# Patient Record
Sex: MALE | URBAN - METROPOLITAN AREA
[De-identification: names, ages, dates, MRNs, and addresses within clinical notes are randomized per-mention and may not be internally consistent; named-entity substitution may affect disease eponyms.]

---

## 2020-08-03 ENCOUNTER — LAB REQUISITION (OUTPATIENT)
Dept: LAB | Facility: HOSPITAL | Age: 80
End: 2020-08-03
Payer: MEDICARE

## 2020-08-03 DIAGNOSIS — U07.1 COVID-19: ICD-10-CM

## 2020-08-03 DIAGNOSIS — Z11.59 ENCOUNTER FOR SCREENING FOR OTHER VIRAL DISEASES: ICD-10-CM

## 2020-08-03 DIAGNOSIS — Z77.098 CONTACT WITH AND (SUSPECTED) EXPOSURE TO OTHER HAZARDOUS, CHIEFLY NONMEDICINAL, CHEMICALS: ICD-10-CM

## 2020-08-03 PROCEDURE — U0003 INFECTIOUS AGENT DETECTION BY NUCLEIC ACID (DNA OR RNA); SEVERE ACUTE RESPIRATORY SYNDROME CORONAVIRUS 2 (SARS-COV-2) (CORONAVIRUS DISEASE [COVID-19]), AMPLIFIED PROBE TECHNIQUE, MAKING USE OF HIGH THROUGHPUT TECHNOLOGIES AS DESCRIBED BY CMS-2020-01-R: HCPCS | Performed by: PHYSICIAN ASSISTANT

## 2020-08-04 LAB — SARS-COV-2 N GENE RESP QL NAA+PROBE: NEGATIVE

## 2024-07-22 ENCOUNTER — NURSING HOME VISIT (OUTPATIENT)
Dept: GERIATRICS | Facility: OTHER | Age: 84
End: 2024-07-22
Payer: MEDICARE

## 2024-07-22 DIAGNOSIS — G20.A2 PARKINSON'S DISEASE WITH FLUCTUATING MANIFESTATIONS, UNSPECIFIED WHETHER DYSKINESIA PRESENT: Chronic | ICD-10-CM

## 2024-07-22 DIAGNOSIS — R41.89 COGNITIVE IMPAIRMENT: ICD-10-CM

## 2024-07-22 DIAGNOSIS — F32.9 REACTIVE DEPRESSION: ICD-10-CM

## 2024-07-22 DIAGNOSIS — Z86.73 H/O: CVA (CEREBROVASCULAR ACCIDENT): Chronic | ICD-10-CM

## 2024-07-22 DIAGNOSIS — E03.9 ACQUIRED HYPOTHYROIDISM: Chronic | ICD-10-CM

## 2024-07-22 DIAGNOSIS — R13.12 OROPHARYNGEAL DYSPHAGIA: ICD-10-CM

## 2024-07-22 DIAGNOSIS — J69.0 ASPIRATION PNEUMONITIS (HCC): Primary | ICD-10-CM

## 2024-07-22 DIAGNOSIS — D64.9 ANEMIA, UNSPECIFIED TYPE: ICD-10-CM

## 2024-07-22 DIAGNOSIS — I10 PRIMARY HYPERTENSION: Chronic | ICD-10-CM

## 2024-07-22 DIAGNOSIS — R26.2 AMBULATORY DYSFUNCTION: ICD-10-CM

## 2024-07-22 DIAGNOSIS — G93.41 ACUTE METABOLIC ENCEPHALOPATHY: ICD-10-CM

## 2024-07-22 DIAGNOSIS — C84.44 PERIPHERAL T CELL LYMPHOMA OF AXILLARY LYMPH NODES (HCC): Chronic | ICD-10-CM

## 2024-07-22 PROBLEM — N39.42 URINARY INCONTINENCE WITHOUT SENSORY AWARENESS: Chronic | Status: ACTIVE | Noted: 2018-06-29
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
## 2024-07-22 PROBLEM — G20.A1 PARKINSON'S DISEASE: Chronic | Status: ACTIVE | Noted: 2018-05-23

## 2024-07-22 PROBLEM — K92.0 COFFEE GROUND EMESIS: Status: ACTIVE | Noted: 2024-06-19

## 2024-07-22 PROBLEM — E78.5 HYPERLIPIDEMIA: Chronic | Status: ACTIVE | Noted: 2018-05-04

## 2024-07-22 PROCEDURE — 99306 1ST NF CARE HIGH MDM 50: CPT | Performed by: INTERNAL MEDICINE

## 2024-07-22 RX ORDER — QUETIAPINE FUMARATE 25 MG/1
25 TABLET, FILM COATED ORAL
COMMUNITY
Start: 2024-07-19 | End: 2024-08-18

## 2024-07-22 RX ORDER — LEVOTHYROXINE SODIUM 0.03 MG/1
25 TABLET ORAL DAILY
COMMUNITY
Start: 2024-07-19

## 2024-07-22 RX ORDER — PAROXETINE 10 MG/1
10 TABLET, FILM COATED ORAL DAILY
COMMUNITY
Start: 2024-07-19

## 2024-07-22 RX ORDER — ASPIRIN 81 MG/1
81 TABLET ORAL DAILY
COMMUNITY
Start: 2024-07-19

## 2024-07-22 RX ORDER — RIVASTIGMINE 9.5 MG/24H
1 PATCH, EXTENDED RELEASE TRANSDERMAL DAILY
COMMUNITY
Start: 2024-07-19 | End: 2025-07-19

## 2024-07-22 RX ORDER — CHOLECALCIFEROL (VITAMIN D3) 50 MCG
1 TABLET ORAL DAILY
COMMUNITY

## 2024-07-22 RX ORDER — ASCORBIC ACID 500 MG
500 TABLET ORAL DAILY
COMMUNITY

## 2024-07-22 RX ORDER — ATORVASTATIN CALCIUM 80 MG/1
80 TABLET, FILM COATED ORAL DAILY
COMMUNITY
Start: 2024-07-19

## 2024-07-22 NOTE — ASSESSMENT & PLAN NOTE
Possibly multiple contribution also including achalasia  Continue ground meats and nectar thick liquids  Continue monitoring for aspiration   Speech eval and treat

## 2024-07-22 NOTE — ASSESSMENT & PLAN NOTE
BP reviewed from facility records, acceptable range   Not on meds possibly due to parkinson's disease which can cause autonomic dysfunction   Continue monitoring BP

## 2024-07-22 NOTE — ASSESSMENT & PLAN NOTE
Was treated with antibiotics - completed course  Daughter reports that at rehab he was at had food that he should not be having resulting in possible aspiration  Continue with aspiration precautions

## 2024-07-22 NOTE — ASSESSMENT & PLAN NOTE
Continue with sinemet  Continue with seroquel and exelon patch   Continue monitoring symptoms  Continue with safety measures

## 2024-07-22 NOTE — ASSESSMENT & PLAN NOTE
Multifactorial   Poor safety awareness also contributing   Hx of fall at other facility with staff helping patient  Continue with safety measures   Continue with fall precautions  PT/OT eval and treat

## 2024-07-22 NOTE — PROGRESS NOTES
[facility] notes  SHORT TERM REHAB       NAME: Cheikh Magana  AGE: 83 y.o. SEX: male    DATE OF ENCOUNTER: 7/22/2024    Assessment and Plan   Aspiration pneumonitis (HCC)  Was treated with antibiotics - completed course  Daughter reports that at rehab he was at had food that he should not be having resulting in possible aspiration  Continue with aspiration precautions     Oropharyngeal dysphagia  Possibly multiple contribution also including achalasia  Continue ground meats and nectar thick liquids  Continue monitoring for aspiration   Speech eval and treat    Ambulatory dysfunction  Multifactorial   Poor safety awareness also contributing   Hx of fall at other facility with staff helping patient  Continue with safety measures   Continue with fall precautions  PT/OT eval and treat     Acute metabolic encephalopathy  Was agitated   Possible also hospital delirium when in the hospital  Seems to be better   Continue with safety measures  Continue with delirium monitoring   Continue with supportive care     Cognitive impairment  May have a baseline memory issue  Continue with supportive care     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)  Continue follow up with oncology       Parkinson's disease  Continue with sinemet  Continue with seroquel and exelon patch   Continue monitoring symptoms  Continue with safety measures     Acquired hypothyroidism  Continue synthroid 25mcg po daily   Last TSH on 7/14/24 normal      Hypertension  BP reviewed from facility records, acceptable range   Not on meds possibly due to parkinson's disease which can cause autonomic dysfunction   Continue monitoring BP        Anemia  Last hb 10.0 on 7/18/24  Currently no signs of bleeding  Will repeat CBC       Reactive depression  Continue paxil  Continue monitoring symptoms   Continue with supportive care     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 Patient reports he does not recall anything that happened. Called and talked to daughter over the phone. As per daughter he was initially managed achalasia and dysphagia then discharged to inpatient acute rehab. She reported that he was sent to the hospital from there because he became lethargic and had a fever. She also reported that he was eating food at the other facility which he should not have been. She saw food on one prior visit that he was not supposed to be on it one time she went. She reports that is why he aspirated and aspiration pneumonitis. As per hospital records he was admitted and managed for aspiration pneumonitis. Daughter also report that he become agitated and confused in the hospital requiring medications. His symptoms improved and he become stable when he was  discharged to [facility] for therapy. Reviewed labs and imaging reports from the hospital records.       PMHx     Past Medical History:   Diagnosis Date    Alcohol abuse     Dementia (HCC)     Depression     HLD (hyperlipidemia)     Hypertension     Hypothyroidism     Lung cancer (HCC)     Lymphoma (HCC)     Parkinson disease     Stroke (HCC)     Urinary tract infection      Past Surgical History:   Procedure Laterality Date    CARPAL TUNNEL RELEASE      CATARACT EXTRACTION      LUNG LOBECTOMY       Family History   Problem Relation Age of Onset    Prostate cancer Father     Breast cancer Sister     Parkinsonism Sister      Social History     Socioeconomic History    Marital status: Unknown     Spouse name: None    Number of children: None    Years of education: None    Highest education level: None   Occupational History    None   Tobacco Use    Smoking status: Former     Types: Cigarettes    Smokeless tobacco: Never   Substance and Sexual Activity    Alcohol use: Not Currently    Drug use: Never     Sexual activity: None   Other Topics Concern    None   Social History Narrative    None     Social Determinants of Health     Financial Resource Strain: Low Risk  (7/15/2024)    Received from [facility]    Overall Financial Resource Strain (CARDIA)     Difficulty of Paying Living Expenses: Not hard at all   Food Insecurity: No Food Insecurity (7/15/2024)    Received from [facility]    Hunger Vital Sign     Worried About Running Out of Food in the Last Year: Never true     Ran Out of Food in the Last Year: Never true   Transportation Needs: No Transportation Needs (7/15/2024)    Received from [facility]    PRAPARE - Transportation     Lack of Transportation (Medical): No     Lack of Transportation (Non-Medical): No   Recent Concern: Transportation Needs - Unmet Transportation Needs (6/28/2024)    [facility]     Lack of Transportation (Medical): Yes     Lack of Transportation (Non-Medical): Patient declined   Physical Activity: Not on file   Stress: No Stress Concern Present (7/8/2024)    Received from [facility]    [employer] [place] [employer] - Occupational Stress Questionnaire     Feeling of Stress : Only a little   Social Connections: Feeling Somewhat Isolated (7/8/2024)    Received from [facility]    OASIS : Social Isolation     How often do you feel lonely or isolated from those around you?: Sometimes   Intimate Partner Violence: Not At Risk (7/15/2024)    Received from [facility]    Humiliation, Afraid, Rape, and Kick questionnaire     Fear of Current or Ex-Partner: No     Emotionally Abused: No     Physically Abused: No     Sexually Abused: No   Housing Stability: Low Risk  (7/15/2024)    Received from [facility]    Housing Stability Vital Sign     Unable to Pay for Housing in the Last Year: No     Number of  Times Moved in the Last Year: 0     Homeless in the Last Year: No     Allergies   Allergen Reactions    Itraconazole Other (See Comments)     Made rash worse per patient's daughter, Jackie   Other reaction(s): Rash    Prednisone Other (See Comments) and Rash     rash       Review of Systems     Denies any pain or shortness of breath   All other review of system negative but hx limited due to poor cognition         Objective   Vital signs:  BP: 124/75  HR: 70  RR: 16  TEMP: 98.4F  SAT.: 98% on RA     PHYSICAL EXAM:  GENERAL: no acute distress  SKIN: warm, dry, no rash, no cyanosis  HEENT: normocephalic, atraumatic, no JVD, no Thyromegaly, no lymphadenopathy  LUNGS: decreased and poor effort, no wheezing, no rales, expanded equally, no chest tenderness   HEART: normal rhythm, normal rate, no murmur, no gallop  ABDOMEN: soft non tender non distended bs+, no guarding or rebound tenderness  :  no suprapubic tenderness  MUSCULOSKELETAL: strength about 4+/5 all extremities, ROM within normal, no calf tenderness  NEUROLOGY: awake, alert, oriented to person but does not know place he is in but knows he is BRITTNEY [place]illy and thought it was Saturday, July 24, 2020, able to recall 1/3 object. CN2-12 intact.   PSYCH: cooperative, restless.       Pertinent Laboratory/Diagnostic Studies:  Recent labs and diagnostic tests reviewed in nursing home EMR    Current Medications   Medications reviewed and signed off on nursing home EMR.        I have spent a total time of 54 minutes on 07/22/24 in caring for this patient including Patient and family education, Counseling / Coordination of care, Documenting in the medical record, Reviewing / ordering tests, medicine, procedures  , and Obtaining or reviewing history  .

## 2024-07-22 NOTE — ASSESSMENT & PLAN NOTE
Was agitated   Possible also hospital delirium when in the hospital  Seems to be better   Continue with safety measures  Continue with delirium monitoring   Continue with supportive care

## 2024-07-29 ENCOUNTER — NURSING HOME VISIT (OUTPATIENT)
Dept: GERIATRICS | Facility: OTHER | Age: 84
End: 2024-07-29
Payer: MEDICARE

## 2024-07-29 DIAGNOSIS — R26.2 AMBULATORY DYSFUNCTION: Primary | ICD-10-CM

## 2024-07-29 DIAGNOSIS — J69.0 ASPIRATION PNEUMONITIS (HCC): ICD-10-CM

## 2024-07-29 DIAGNOSIS — C84.44 PERIPHERAL T CELL LYMPHOMA OF AXILLARY LYMPH NODES (HCC): Chronic | ICD-10-CM

## 2024-07-29 DIAGNOSIS — F32.9 REACTIVE DEPRESSION: ICD-10-CM

## 2024-07-29 PROCEDURE — 99309 SBSQ NF CARE MODERATE MDM 30: CPT | Performed by: INTERNAL MEDICINE

## 2024-07-29 RX ORDER — IPRATROPIUM BROMIDE AND ALBUTEROL SULFATE 2.5; .5 MG/3ML; MG/3ML
3 SOLUTION RESPIRATORY (INHALATION) EVERY 8 HOURS PRN
COMMUNITY

## 2024-07-29 NOTE — ASSESSMENT & PLAN NOTE
Multifactorial   Poor safety awareness   Continue with safety measures  Continue with fall precautions  Reviewed therapy notes  Discussed with therapist about patient

## 2024-07-29 NOTE — PROGRESS NOTES
[facility] notes  SHORT TERM REHAB       NAME: Cheikh Magana  AGE: 83 y.o. 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 Patient seen for ambulatory dysfunction, dysphagia, T cell lymphoma and depression. Reviewed nursing notes since last visit.    PMHx     Past Medical History:   Diagnosis Date    Alcohol abuse     Dementia (HCC)     Depression     HLD (hyperlipidemia)     Hypertension     Hypothyroidism     Lung cancer (HCC)     Lymphoma (HCC)     Parkinson disease     Stroke (HCC)     Urinary tract infection      Past Surgical History:   Procedure Laterality Date    CARPAL TUNNEL RELEASE      CATARACT EXTRACTION      LUNG LOBECTOMY       Family History   Problem Relation Age of Onset    Prostate cancer Father     Breast cancer Sister     Parkinsonism Sister      Social History     Socioeconomic History    Marital status: Unknown     Spouse name: Not on file    Number of children: Not on file    Years of education: Not on file    Highest education level: Not on file   Occupational History    Not on file   Tobacco Use    Smoking status: Former     Types:  Cigarettes    Smokeless tobacco: Never   Substance and Sexual Activity    Alcohol use: Not Currently    Drug use: Never    Sexual activity: Not on file   Other Topics Concern    Not on file   Social History Narrative    Not on file     Social Determinants of Health     Financial Resource Strain: Low Risk  (7/15/2024)    Received from [facility]    Overall Financial Resource Strain (CARDIA)     Difficulty of Paying Living Expenses: Not hard at all   Food Insecurity: No Food Insecurity (7/15/2024)    Received from [facility]    Hunger Vital Sign     Worried About Running Out of Food in the Last Year: Never true     Ran Out of Food in the Last Year: Never true   Transportation Needs: No Transportation Needs (7/15/2024)    Received from [facility]    PRAPARE - Transportation     Lack of Transportation (Medical): No     Lack of Transportation (Non-Medical): No   Recent Concern: Transportation Needs - Unmet Transportation Needs (6/28/2024)    [facility]     Lack of Transportation (Medical): Yes     Lack of Transportation (Non-Medical): Patient declined   Physical Activity: Not on file   Stress: No Stress Concern Present (7/8/2024)    Received from [facility]    [employer] [place] [employer] - Occupational Stress Questionnaire     Feeling of Stress : Only a little   Social Connections: Feeling Somewhat Isolated (7/8/2024)    Received from [facility]    OASIS : Social Isolation     How often do you feel lonely or isolated from those around you?: Sometimes   Intimate Partner Violence: Not At Risk (7/15/2024)    Received from [facility]    Humiliation, Afraid, Rape, and Kick questionnaire     Fear of Current or Ex-Partner: No     Emotionally Abused: No     Physically Abused: No     Sexually Abused: No   Housing Stability: Low Risk  (7/15/2024)     Received from [facility]    Housing Stability Vital Sign     Unable to Pay for Housing in the Last Year: No     Number of Times Moved in the Last Year: 0     Homeless in the Last Year: No     Allergies   Allergen Reactions    Itraconazole Other (See Comments)     Made rash worse per patient's daughterJackie   Other reaction(s): Rash    Prednisone Other (See Comments) and Rash     rash       Review of Systems     Denies any pain or shortness of breath   All other review of system negative but hx limited due to cognitive impairment         Objective   Vital signs reviewed from facility records.     PHYSICAL EXAM:  GENERAL: no acute distress  SKIN: warm, dry, no rash, no cyanosis  HEENT: normocephalic, atraumatic, no JVD, no Thyromegaly, no lymphadenopathy  LUNGS: CTA, no wheezing, no rales, expanded equally, no chest tenderness   HEART: normal rhythm, normal rate, no murmur, no gallop  ABDOMEN: soft non tender non distended bs+, no guarding or rebound tenderness  :  no suprapubic tenderness  MUSCULOSKELETAL: strength about 4+/5 all extremities,  no calf tenderness  NEUROLOGY: awake, alert, CN2-12 intact.   PSYCH: cooperative, pleasant.       Pertinent Laboratory/Diagnostic Studies:  Recent labs and diagnostic tests reviewed in nursing home EMR    Current Medications   Medications reviewed and signed off on nursing home EMR.

## 2024-07-29 NOTE — ASSESSMENT & PLAN NOTE
Looks like had another aspiration episode  Took roommates drink and drank it as per staff  CXR was done showed RLL   Was placed on augmentin will complete the course  Continue monitoring vitals  Seems to be doing well

## 2024-08-06 ENCOUNTER — NURSING HOME VISIT (OUTPATIENT)
Dept: GERIATRICS | Facility: OTHER | Age: 84
End: 2024-08-06
Payer: MEDICARE

## 2024-08-06 DIAGNOSIS — C84.10 SEZARY'S DISEASE, UNSPECIFIED BODY REGION (HCC): Chronic | ICD-10-CM

## 2024-08-06 DIAGNOSIS — E78.5 HYPERLIPIDEMIA, UNSPECIFIED HYPERLIPIDEMIA TYPE: Chronic | ICD-10-CM

## 2024-08-06 DIAGNOSIS — C84.44 PERIPHERAL T CELL LYMPHOMA OF AXILLARY LYMPH NODES (HCC): Primary | Chronic | ICD-10-CM

## 2024-08-06 DIAGNOSIS — I10 PRIMARY HYPERTENSION: Chronic | ICD-10-CM

## 2024-08-06 DIAGNOSIS — D64.9 ANEMIA, UNSPECIFIED TYPE: ICD-10-CM

## 2024-08-06 DIAGNOSIS — R26.2 AMBULATORY DYSFUNCTION: ICD-10-CM

## 2024-08-06 DIAGNOSIS — F32.9 REACTIVE DEPRESSION: ICD-10-CM

## 2024-08-06 DIAGNOSIS — E03.9 ACQUIRED HYPOTHYROIDISM: Chronic | ICD-10-CM

## 2024-08-06 DIAGNOSIS — Z86.73 H/O: CVA (CEREBROVASCULAR ACCIDENT): Chronic | ICD-10-CM

## 2024-08-06 DIAGNOSIS — G20.A2 PARKINSON'S DISEASE WITH FLUCTUATING MANIFESTATIONS, UNSPECIFIED WHETHER DYSKINESIA PRESENT: Chronic | ICD-10-CM

## 2024-08-06 DIAGNOSIS — R13.12 OROPHARYNGEAL DYSPHAGIA: ICD-10-CM

## 2024-08-06 PROCEDURE — 99309 SBSQ NF CARE MODERATE MDM 30: CPT | Performed by: NURSE PRACTITIONER

## 2024-08-06 RX ORDER — TRIAMCINOLONE ACETONIDE 1 MG/G
1 CREAM TOPICAL 2 TIMES DAILY
COMMUNITY

## 2024-08-06 NOTE — PROGRESS NOTES
"[facility]  [address], [address] [place] [address]  [phone]  DISCHARGE SUMMARY  Facility: [facility]  POS: 31: SNF/Short Term Rehab      NAME: Cheikh Magana  AGE: 83 y.o. SEX: male  DATE OF ADMISSION:   DATE OF DISCHARGE:AUGUST 8, 2024  DISCHARGE DISPOSITION: TO [facility] ([facility]: [facility])    Reason for admission: This is an 83-year-old male patient currently admitted at [facility] (7/19/2024 to present) for skilled nursing and rehabilitation services following discharge from acute care hospitalization ([facility]: 7/14-19/2024) with Dx of Acute Metabolic Encephalopathy, Sepsis - likely aspiration Pneumonitis, Ambulatory dysfunction    Admission Diagnoses: Ambulatory dysfunction with deconditioning  Discharge Diagnoses:   Ambulatory dysfunction  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    Course of stay: Patient is currently admitted at [facility] for rehabilitation services due to ambulatory dysfunction and deconditioning. Patient received 24/7 SNF supportive care, PT/OT/ST services. To date, patient stay in SNF has been uneventful.  Patient is a repair for this visit - well dressed -alert, cooperative, calm, very pleasant and not in distress.  Patient is verbal with clear coherent speech -oriented to name/birthday/ current year (2024) only.patient was not feeling well at this visit , \" I feel fine\" -denies any acute medical concerns during ROS assessment including pain. Per , patient is scheduled for discharge to [facility] on 8/8/2024.     Labs and testing performed during stay: Most recent lab results as below:     CBC with diff (8/5/2024):  (ABNORMAL) CBC WITH DIFF (08/05/2024 " 4:54 AM EDT)  Lab Results - (ABNORMAL) CBC WITH DIFF (08/05/2024 4:54 AM EDT)  Component Value Ref Range Test Method Analysis Time Performed At Pathologist Signature   Hemoglobin 10.2 (L) 12.5 - 17.0 g/dL   08/05/2024 11:09 AM EDT [facility]     Hematocrit 29.6 (L) 37.0 - 48.0 %   08/05/2024 11:09 AM EDT [facility]     WBC 3.3 (L) 4.0 - 10.5 thou/cmm   08/05/2024 11:09 AM EDT [facility]     RBC 3.71 (L) 4.00 - 5.40 mill/cmm   08/05/2024 11:09 AM EDT [facility]     Platelet Count 250 140 - 350 thou/cmm   08/05/2024 11:09 AM EDT [facility]     MPV 7.0 (L) 7.5 - 11.3 fL   08/05/2024 11:09 AM EDT [facility]     MCV 80 80 - 100 fL   08/05/2024 11:09 AM EDT [facility]     MCH 27.4 27.0 - 36.0 pg   08/05/2024 11:09 AM EDT [facility]     MCHC 34.3 32.0 - 37.0 g/dL   08/05/2024 11:09 AM EDT [facility]     RDW 16.1 (H) 12.0 - 16.0 %   08/05/2024 11:09 AM EDT [facility]     Differential Type AUTO     08/05/2024 11:09 AM EDT [facility]     Absolute Neutrophils 2.5 1.8 - 7.8 thou/cmm   08/05/2024 11:09 AM EDT [facility]     Absolute Lymphocytes 0.2 (L) 1.0 - 3.0 thou/cmm   08/05/2024 11:09 AM EDT [facility]     Absolute Monocytes 0.3 0.3 - 1.0 thou/cmm   08/05/2024 11:09 AM EDT [facility]     Absolute Eosinophils 0.2 0.0 - 0.5 thou/cmm   08/05/2024 11:09 AM EDT [facility]     Absolute Basophils 0.0 0.0 - 0.1 thou/cmm   08/05/2024 11:09 AM EDT [facility]     Neutrophils 76 %   08/05/2024 11:09 AM EDT [facility]     Lymphocytes 6 %   08/05/2024 11:09 AM EDT [facility]     Monocytes 11 %   08/05/2024 11:09 AM EDT [facility]     Eosinophils 6 %   08/05/2024 11:09 AM EDT [facility]     Basophils 1 %   08/05/2024 11:09 AM EDT [facility]        CMP (8/5/2024):  (ABNORMAL) COMPREHENSIVE METABOLIC PANEL (08/05/2024 4:54 AM EDT)  Lab Results -  (ABNORMAL) COMPREHENSIVE METABOLIC PANEL (08/05/2024 4:54 AM EDT)  Component Value Ref Range Test Method Analysis Time Performed At Pathologist Signature   Glucose 85 65 - 99 mg/dL   08/05/2024 11:22 AM EDT [facility]     BUN 20 7 - 28 mg/dL   08/05/2024 11:22 AM EDT [facility]     Creatinine 0.75 0.53 - 1.30 mg/dL   08/05/2024 11:22 AM EDT [facility]     Sodium 139 135 - 145 mmol/L   08/05/2024 11:22 AM EDT [facility]     Potassium 4.2 3.5 - 5.2 mmol/L   08/05/2024 11:22 AM EDT [facility]     Chloride 103 100 - 109 mmol/L   08/05/2024 11:22 AM EDT [facility]     Carbon Dioxide 26 21 - 31 mmol/L   08/05/2024 11:22 AM EDT [facility]     Calcium 8.7 8.5 - 10.1 mg/dL   08/05/2024 11:22 AM EDT [facility]     Alkaline Phosphatase 74 35 - 120 U/L   08/05/2024 11:22 AM EDT [facility]     Albumin 3.4 (L) 3.5 - 5.7 g/dL   08/05/2024 11:22 AM EDT [facility]     Bilirubin, Total 0.4 0.2 - 1.0 mg/dL   08/05/2024 11:22 AM EDT [facility]     Comment: Eltrombopag and its metabolites may interfere with this assay causing erroneously high patient results.   Protein, Total 6.5 6.3 - 8.3 g/dL   08/05/2024 11:22 AM EDT [facility]     AST 8 <41 U/L   08/05/2024 11:22 AM EDT [facility]     ALT 5 <56 U/L   08/05/2024 11:22 AM EDT [facility]     Anion Gap 10 3 - 11   08/05/2024 11:22 AM EDT [facility]     eGFRcr 90 >59   08/05/2024 11:22 AM EDT [facility]     eGFRcr Comment Interpretive information: calculated GFR     08/05/2024 11:22 AM EDT [facility]         Mg (8/5/2024) = 1.8    HISTORY:  Medical Hx: Reviewed, unchanged  Family Hx: Reviewed, unchanged  Soc Hx: Reviewed,  unchanged    ALLERGY: Reviewed, unchanged.  Allergies   Allergen Reactions    Itraconazole Other (See Comments)     Made rash worse per patient's daughterJackie   Other reaction(s): Rash    Prednisone Other (See  "Comments) and Rash     rash       Discharge Medications: See discharge medication list which was reviewed and signed.    Status at time of discharge: Stable    Today's Visit: 8/7/20242:55 PM    Subjective:\" I feel fine\"    Vitals:T: 98.0F -P68 -R16 BP: 128/88 (8/3/2024) SpO2: 97% RA  Weight: 159.1 lbs (8/4/2024) <= 154.1 lbs (7/20/2024)    Review of Systems   Constitutional: Negative.    HENT: Negative.     Eyes: Negative.    Respiratory: Negative.     Cardiovascular: Negative.    Gastrointestinal: Negative.    Endocrine: Negative.    Genitourinary: Negative.    Musculoskeletal: Negative.    Skin: Negative.    Allergic/Immunologic: Negative.    Neurological: Negative.    Hematological: Negative.    Psychiatric/Behavioral: Negative.         Exam: Physical Exam  Vitals and nursing note reviewed.   Constitutional:       General: He is not in acute distress.     Appearance: He is normal weight. He is not ill-appearing, toxic-appearing or diaphoretic.      Comments: Frail stature   HENT:      Head: Normocephalic and atraumatic.      Right Ear: Tympanic membrane, ear canal and external ear normal. There is no impacted cerumen.      Left Ear: Tympanic membrane, ear canal and external ear normal. There is no impacted cerumen.      Nose: Nose normal. No congestion or rhinorrhea.      Mouth/Throat:      Mouth: Mucous membranes are moist.      Pharynx: Oropharynx is clear. No oropharyngeal exudate or posterior oropharyngeal erythema.   Eyes:      General: No scleral icterus.        Right eye: No discharge.         Left eye: No discharge.      Conjunctiva/sclera: Conjunctivae normal.      Pupils: Pupils are equal, round, and reactive to light.   Neck:      Vascular: No carotid bruit.   Cardiovascular:      Rate and Rhythm: Normal rate. Rhythm irregular.      Heart sounds: No murmur heard.     No friction rub. Gallop present.   Pulmonary:      Effort: Pulmonary effort is normal. No respiratory distress.      Breath sounds: " Normal breath sounds. No stridor. No wheezing, rhonchi or rales.   Chest:      Chest wall: No tenderness.   Abdominal:      General: Abdomen is flat. Bowel sounds are normal. There is no distension.      Palpations: Abdomen is soft. There is no mass.      Tenderness: There is no abdominal tenderness. There is no right CVA tenderness, left CVA tenderness, guarding or rebound.      Hernia: No hernia is present.   Genitourinary:     Comments: Non distended bladder. Continent of B/B  Musculoskeletal:         General: No swelling, tenderness, deformity or signs of injury. Normal range of motion.      Cervical back: Normal range of motion and neck supple. No rigidity or tenderness.      Right lower leg: No edema.      Left lower leg: No edema.      Comments: Uses manual wheelchair   Lymphadenopathy:      Cervical: No cervical adenopathy.   Skin:     General: Skin is warm.      Capillary Refill: Capillary refill takes less than 2 seconds.      Coloration: Skin is not jaundiced or pale.      Findings: No bruising, erythema, lesion or rash.      Comments: Intact skin. NO DTI to bony prominences including heels   Neurological:      General: No focal deficit present.      Mental Status: He is alert. Mental status is at baseline.      Cranial Nerves: No cranial nerve deficit.      Sensory: No sensory deficit.      Motor: No weakness.      Coordination: Coordination normal.      Gait: Gait abnormal.      Deep Tendon Reflexes: Reflexes normal.      Comments: Verbal with clear coherent speech -oriented x 2 .   Psychiatric:         Mood and Affect: Mood normal.         Behavior: Behavior normal.      Comments: Alert, cooperative, calm, very pleasant and not in distress.       Discussion with patient/family and further instructions:  -Fall precautions  -Aspiration precautions  -Bleeding precautions  -Monitor for signs/symptoms of infection  -Medication list was reviewed and signed  -DME form was completed    Follow-up  Recommendations:     * Please follow-up with your primary care physician within 7-10 days of discharge to review medication changes and current status. The family/ patient needs to set-up an appointment for YAMIL with PCP upon discharge to [facility]..    * Recommend PT/OT service to continue strengthening, gait, balance and overall functional independence improvement gained during in-patient rehabilitation.      Problem List Follow-up Recommendations:    Sezary's disease (HCC)  Continue Triamcinolone 0.1% cream to rash on trunk and chest areas.  Followed and managed by Dermatology and Hema/Onco offices as out-patient.    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 office as out-patient:  - with cognitive decline likely Dementia  - Hx of hallucination  Continue Quetiapine 25mg daily/ Rivastigmine 9.5mg patch daily    Hypertension  BP range (7/19/24 to 8/3/2024) = 112/58 to 145/74  HR range (7/19/24 to 8/3/2024) = 59 to 92/min  Not on anti-HTN medication including diuretic therapy  Continue BP/HR checks in [facility]    Acquired hypothyroidism  Lab Results   Component Value Date    TSH 1.17 07/14/2024   Continue Levothyroxine 25mcg daily    H/O: CVA (cerebrovascular accident)  Hx of Left MCA ischemic stroke  Continue ASA 81mg daily/ Atorvastatin 80mg daily    Hyperlipidemia  Last Lipid test (2/11/2021): WNL except for low HDL: 32. LDL C: 44  Continue Atorvastatin 80mg daily    Anemia  Mild Chronic Anemia  Last Hbg/Hct (8/5/2024): 10.2/ 29.6 (L) / RBC: 3.71 (L) / RDW: 16.1 (H) <=  Hbg/Hct (7/2/2024): 9.6/27.0 (L)  Not in iron replacement  Continue Vit C 500mg daily  Recommend check Anemia Profile in [facility]    Reactive depression  Continue Paroxetine 10mg  daily  Recommend referral to Behavioral Health CRNP in [facility]    Oropharyngeal dysphagia  Recent management for sepsis - likely from aspiration Pneumonia  Continue current diet: Regular , Ground meats with Nectar thick liquids    Ambulatory dysfunction  Per therapy: Encourage use of assistive device  Transfers with assist of one and RW    Continue safety and fall precautions in [facility]      CURRENT MEDICATION LIST IN [facility]:    Current Outpatient Medications:     ascorbic acid (VITAMIN C) 500 mg tablet, Take 500 mg by mouth daily, Disp: , Rfl:     aspirin (ECOTRIN LOW STRENGTH) 81 mg EC tablet, Take 81 mg by mouth daily, Disp: , Rfl:     atorvastatin (LIPITOR) 80 mg tablet, Take 80 mg by mouth daily, Disp: , Rfl:     carbidopa-levodopa (SINEMET)  mg per tablet, Take 2 tablets by mouth Three times a day, Disp: , Rfl:     Cholecalciferol (Vitamin D3 Super Strength) 50 MCG (2000 UT) TABS, Take 1 tablet by mouth in the morning, Disp: , Rfl:     ipratropium-albuterol (DUO-NEB) 0.5-2.5 mg/3 mL nebulizer solution, Take 3 mL by nebulization every 8 (eight) hours as needed for wheezing or shortness of breath, Disp: , Rfl:     levothyroxine 25 mcg tablet, Take 25 mcg by mouth daily, Disp: , Rfl:     PARoxetine (PAXIL) 10 mg tablet, Take 10 mg by mouth daily, Disp: , Rfl:     QUEtiapine (SEROquel) 25 mg tablet, Take 25 mg by mouth daily at bedtime, Disp: , Rfl:     rivastigmine (EXELON) 9.5 mg/24 hr TD 24 hr patch, Place 1 patch on the skin daily, Disp: , Rfl:     triamcinolone (KENALOG) 0.1 % cream, Apply 1 Application topically 2 (two) times a day = to rash on trunk and chest, Disp: , Rfl:       Discharge Statement:  * Spent more than 30 minutes discharging the patient; greater than 50% spent on physical examination of patient, answering questions, discussion of plan of care, recommendations and providing post discharge instructions. Additional time spend on other discharge related activities including  "documentation.    Please note: This note was completed in part utilizing a voice-recognition software may have been used in the preparation of this document. Grammatical errors, random word insertion, spelling mistakes, and incomplete sentences may be an occasional consequence of the system secondary to software limitations, ambient noise and hardware issues. Occasional wrong word or \"sound-alike\" substitutions may have occurred due to the inherent limitations of voice recognition software. At the time of dictation, efforts were made to edit, clarify and/or correct errors. Interpretation should be guided by context. Please read the chart carefully and recognize, using context, where substitutions have occurred. If you have any questions or concerns about the context, text or information contained within the body of this dictation, please contact myself, the provider, for further clarification.      SOLE Esquivel  8/7/20242:55 PM  "

## 2024-08-07 ENCOUNTER — NURSING HOME VISIT (OUTPATIENT)
Dept: GERIATRICS | Facility: OTHER | Age: 84
End: 2024-08-07
Payer: MEDICARE

## 2024-08-07 DIAGNOSIS — J18.9 PNEUMONIA OF RIGHT LOWER LOBE DUE TO INFECTIOUS ORGANISM: Primary | ICD-10-CM

## 2024-08-07 PROCEDURE — 99309 SBSQ NF CARE MODERATE MDM 30: CPT | Performed by: INTERNAL MEDICINE

## 2024-08-07 NOTE — ASSESSMENT & PLAN NOTE
Most likely due to aspiration  Reported that at times patient drinks and food that he should not from other residents  Now having fever of 103F  Stat CXR ordered which showed right base infiltrate   Stat CBC and BMP pending  Also viral panel pending   Continue prn nebulizers if needed  Monitor vitals qshift including saturation   Will start on rocephin 1g IM BID x 7 days   Continue monitoring symptoms  Discharged postponed due to possible aspiration pneumonia

## 2024-08-07 NOTE — ASSESSMENT & PLAN NOTE
Last Lipid test (2/11/2021): WNL except for low HDL: 32. LDL C: 44  Continue Atorvastatin 80mg daily

## 2024-08-07 NOTE — ASSESSMENT & PLAN NOTE
Mild Chronic Anemia  Last Hbg/Hct (8/5/2024): 10.2/ 29.6 (L) / RBC: 3.71 (L) / RDW: 16.1 (H) <=  Hbg/Hct (7/2/2024): 9.6/27.0 (L)  Not in iron replacement  Continue Vit C 500mg daily  Recommend check Anemia Profile in [facility]

## 2024-08-07 NOTE — ASSESSMENT & PLAN NOTE
Recent management for sepsis - likely from aspiration Pneumonia  Continue current diet: Regular , Ground meats with Nectar thick liquids

## 2024-08-07 NOTE — ASSESSMENT & PLAN NOTE
Continue Triamcinolone 0.1% cream to rash on trunk and chest areas.  Followed and managed by Dermatology and Hema/Onco offices as out-patient.

## 2024-08-07 NOTE — ASSESSMENT & PLAN NOTE
BP range (7/19/24 to 8/3/2024) = 112/58 to 145/74  HR range (7/19/24 to 8/3/2024) = 59 to 92/min  Not on anti-HTN medication including diuretic therapy  Continue BP/HR checks in group home

## 2024-08-07 NOTE — ASSESSMENT & PLAN NOTE
Per therapy: Encourage use of assistive device  Transfers with assist of one and RW    Continue safety and fall precautions in [facility]

## 2024-08-07 NOTE — PROGRESS NOTES
[facility] notes  SHORT TERM REHAB       NAME: Cheikh Magana  AGE: 83 y.o. SEX: male    DATE OF ENCOUNTER: 8/7/2024    Assessment and Plan   Pneumonia of right lower lobe due to infectious organism  Most likely due to aspiration  Reported that at times patient drinks and food that he should not from other residents  Now having fever of 103F  Stat CXR ordered which showed right base infiltrate   Stat CBC and BMP pending  Also viral panel pending   Continue prn nebulizers if needed  Monitor vitals qshift including saturation   Will start on rocephin 1g IM BID x 7 days   Continue monitoring symptoms  Discharged postponed due to possible aspiration pneumonia       Chief Complaint     Not really wanting to talk     History of Present Illness     84 yo male seen for follow up. Patient seen for fever and sounding congested. Staff report patient has been having fever and also episodes of vomiting. He also not himself as per staff. Reviewed nursing notes since last visit.    PMHx     Past Medical History:   Diagnosis Date    Alcohol abuse     Dementia (HCC)     Depression     HLD (hyperlipidemia)     Hypertension     Hypothyroidism     Lung cancer (HCC)     Lymphoma (HCC)     Parkinson disease     Stroke (HCC)     Urinary tract infection      Past Surgical History:   Procedure Laterality Date    CARPAL TUNNEL RELEASE      CATARACT EXTRACTION      LUNG LOBECTOMY       Family History   Problem Relation Age of Onset    Prostate cancer Father     Breast cancer Sister     Parkinsonism Sister      Social History     Socioeconomic History    Marital status: Unknown     Spouse name: Not on file    Number of children: Not on file    Years of education: Not on file    Highest education level: Not on file   Occupational History    Not on file   Tobacco Use    Smoking status: Former     Types: Cigarettes    Smokeless tobacco: Never   Substance and Sexual Activity    Alcohol use: Not Currently    Drug use: Never     Sexual activity: Not on file   Other Topics Concern    Not on file   Social History Narrative    Not on file     Social Determinants of Health     Financial Resource Strain: Low Risk  (7/15/2024)    Received from [facility]    Overall Financial Resource Strain (CARDIA)     Difficulty of Paying Living Expenses: Not hard at all   Food Insecurity: No Food Insecurity (7/15/2024)    Received from [facility]    Hunger Vital Sign     Worried About Running Out of Food in the Last Year: Never true     Ran Out of Food in the Last Year: Never true   Transportation Needs: No Transportation Needs (7/15/2024)    Received from [facility]    PRAPARE - Transportation     Lack of Transportation (Medical): No     Lack of Transportation (Non-Medical): No   Recent Concern: Transportation Needs - Unmet Transportation Needs (6/28/2024)    Received [facility]     Lack of Transportation (Medical): Yes     Lack of Transportation (Non-Medical): Patient declined   Physical Activity: Not on file   Stress: No Stress Concern Present (7/8/2024)    Received from [facility]    [employer] - Occupational Stress Questionnaire     Feeling of Stress : Only a little   Social Connections: Feeling Somewhat Isolated (7/8/2024)    Received from [facility]    OASIS : Social Isolation     How often do you feel lonely or isolated from those around you?: Sometimes   Intimate Partner Violence: Not At Risk (7/15/2024)    Received from [facility]    Humiliation, Afraid, Rape, and Kick questionnaire     Fear of Current or Ex-Partner: No     Emotionally Abused: No     Physically Abused: No     Sexually Abused: No   Housing Stability: Low Risk  (7/15/2024)    Received from [facility]    Housing Stability Vital Sign     Unable to Pay for Housing in the Last  Year: No     Number of Times Moved in the Last Year: 0     Homeless in the Last Year: No     Allergies   Allergen Reactions    Itraconazole Other (See Comments)     Made rash worse per patient's daughterJackie   Other reaction(s): Rash    Prednisone Other (See Comments) and Rash     rash       Review of Systems     Mumbling not really talking   Slumped over in bed on one side         Objective   Vital signs:  BP: 128/88  HR: 68  RR: 16  TEMP: 103.6F  SAT.: 95% on RA    PHYSICAL EXAM:  GENERAL: no acute distress, but ill appearing  SKIN: warm, dry, no rash, no cyanosis  HEENT: normocephalic, atraumatic, no JVD, no Thyromegaly, no lymphadenopathy  LUNGS: + rales at bases more on the right, expanded equally, no chest tenderness   HEART: normal rhythm, normal rate, no murmur, no gallop  ABDOMEN: soft non tender non distended bs+, no guarding or rebound tenderness  :  no suprapubic tenderness  MUSCULOSKELETAL: strength about 4+/5 all extremities,  no calf tenderness  NEUROLOGY: not really talking, mumbles words at times and has eyes closed    PSYCH: cooperative      Pertinent Laboratory/Diagnostic Studies:  Recent labs and diagnostic tests reviewed in nursing home EMR    Current Medications   Medications reviewed and signed off on nursing home EMR.

## 2024-08-07 NOTE — ASSESSMENT & PLAN NOTE
Patient uses manual wheelchair in SNF  Continue safety and fall precaution  Continue PT/OT as out-patient  Continue Sinemet [25-100mg] 2 tablets TID  Followed and managed by [facility] Neurology office as out-patient:  - with cognitive decline likely Dementia  - Hx of hallucination  Continue Quetiapine 25mg daily/ Rivastigmine 9.5mg patch daily

## 2024-08-08 ENCOUNTER — NURSING HOME VISIT (OUTPATIENT)
Dept: GERIATRICS | Facility: OTHER | Age: 84
End: 2024-08-08
Payer: MEDICARE

## 2024-08-08 DIAGNOSIS — R13.12 OROPHARYNGEAL DYSPHAGIA: ICD-10-CM

## 2024-08-08 DIAGNOSIS — J18.9 PNEUMONIA OF RIGHT LOWER LOBE DUE TO INFECTIOUS ORGANISM: Primary | ICD-10-CM

## 2024-08-08 DIAGNOSIS — D69.6 THROMBOCYTOPENIA (HCC): ICD-10-CM

## 2024-08-08 PROCEDURE — 99309 SBSQ NF CARE MODERATE MDM 30: CPT | Performed by: NURSE PRACTITIONER

## 2024-08-27 ENCOUNTER — IN HOME VISIT (OUTPATIENT)
Dept: GERIATRICS | Facility: OTHER | Age: 84
End: 2024-08-27
Payer: MEDICARE

## 2024-08-27 DIAGNOSIS — G20.A2 PARKINSON'S DISEASE WITH FLUCTUATING MANIFESTATIONS, UNSPECIFIED WHETHER DYSKINESIA PRESENT: Primary | Chronic | ICD-10-CM

## 2024-08-27 DIAGNOSIS — F32.9 REACTIVE DEPRESSION: ICD-10-CM

## 2024-08-27 DIAGNOSIS — Z86.73 H/O: CVA (CEREBROVASCULAR ACCIDENT): Chronic | ICD-10-CM

## 2024-08-27 DIAGNOSIS — E03.9 ACQUIRED HYPOTHYROIDISM: Chronic | ICD-10-CM

## 2024-08-27 DIAGNOSIS — C84.44 PERIPHERAL T CELL LYMPHOMA OF AXILLARY LYMPH NODES (HCC): Chronic | ICD-10-CM

## 2024-08-27 DIAGNOSIS — D64.9 ANEMIA, UNSPECIFIED TYPE: ICD-10-CM

## 2024-08-27 DIAGNOSIS — R13.12 OROPHARYNGEAL DYSPHAGIA: ICD-10-CM

## 2024-08-27 DIAGNOSIS — J18.9 PNEUMONIA OF RIGHT LOWER LOBE DUE TO INFECTIOUS ORGANISM: ICD-10-CM

## 2024-08-27 DIAGNOSIS — C84.10 SEZARY'S DISEASE, UNSPECIFIED BODY REGION (HCC): Chronic | ICD-10-CM

## 2024-08-27 DIAGNOSIS — R26.2 AMBULATORY DYSFUNCTION: ICD-10-CM

## 2024-08-27 DIAGNOSIS — E78.2 MIXED HYPERLIPIDEMIA: Chronic | ICD-10-CM

## 2024-08-27 DIAGNOSIS — I10 PRIMARY HYPERTENSION: Chronic | ICD-10-CM

## 2024-08-27 DIAGNOSIS — J00 ACUTE NASOPHARYNGITIS: ICD-10-CM

## 2024-08-27 PROCEDURE — 99350 HOME/RES VST EST HIGH MDM 60: CPT | Performed by: NURSE PRACTITIONER

## 2024-08-27 RX ORDER — QUETIAPINE FUMARATE 25 MG/1
25 TABLET, FILM COATED ORAL
COMMUNITY

## 2024-08-27 RX ORDER — ACETAMINOPHEN 325 MG/1
650 TABLET ORAL EVERY 6 HOURS PRN
COMMUNITY

## 2024-08-27 NOTE — ASSESSMENT & PLAN NOTE
Likely Aspiration Pneumonia  Completed 7 days of Ceftriaxone 1G daily (8/7-13/2024)  Repeat CXR 6 weeks from 8/7/2024: due approximately on week of 9/25/2024  - to check for resolution or clearance of RLL opacity  Has pending follow-up labs on 8/30/2024: CBC with diff and CMP

## 2024-08-27 NOTE — ASSESSMENT & PLAN NOTE
Last Lipid test (2/11/2021): WNL except for:  - low HDL: 32.   LDL C: 44 (2/11/2021)  Continue Atorvastatin 80mg daily  Check Lipid Profile on 8/30/2024

## 2024-08-27 NOTE — ASSESSMENT & PLAN NOTE
Hx of fall in detention (8/18/2024)  Transfers with assist of one and RW    Patient uses manual wheelchair in [facility]  Continue safety and fall precautions in [facility]

## 2024-08-27 NOTE — ASSESSMENT & PLAN NOTE
Followed and managed by Dermatology and Hema/Onco offices as out-patient.  Continue Triamcinolone 0.1% cream to rash on trunk and chest areas.  Currently receiving Poteligeo infusion every 2 weeks   Subjective      Chief Complaint   Patient presents with    ST TEST RESULT           He had a stress test showing ejection fraction 85%.  He had normal wall motion.  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  He is getting sob  with exertion and has stopped smoking since 2014. He has copd.  He is not getting any chest pain or discomfort.   Before the stress test and before he was seen he did have an episode of discomfort and lasted about 3 hours.  Was at 1 am and woke him up.  He has trouble with statins with his stomach           ROS     History reviewed. No pertinent surgical history.     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    Iatrogenic hyperthyroidism 10/27/2023    Lipidosis 10/27/2023    Migraine 10/27/2023    Migraine without aura and without status migrainosus, not intractable 10/27/2023    Mildly underweight adult 10/27/2023    Obstructive sleep apnea syndrome 10/27/2023    Sleep-disordered breathing 10/27/2023    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    Subclinical hyperthyroidism 10/27/2023    Subclinical hypothyroidism 10/27/2023    Valvular heart disease 10/27/2023     Resolved Ambulatory Problems     Diagnosis Date Noted    No Resolved Ambulatory Problems     Past Medical History:   Diagnosis Date    COPD (chronic obstructive pulmonary disease) (CMS/[facility])     Hyperlipidemia, unspecified     Hypothyroidism, unspecified     Lipid  storage disorder, unspecified     Migraine, unspecified, not intractable, without status migrainosus     Obstructive sleep apnea (adult) (pediatric)     Other hypersomnia     Personal history of malignant neoplasm of esophagus     Personal history of other diseases of the circulatory system     Personal history of other diseases of the musculoskeletal system and connective tissue     Personal history of other endocrine, nutritional and metabolic disease     Personal history of other endocrine, nutritional and metabolic disease     Personal history of other specified conditions     Radiculopathy, cervical region     Sleep apnea, unspecified         Visit Vitals  /86   Pulse 78   Wt (!) 41.7 kg (92 lb)   SpO2 95%   BMI 15.31 kg/m²   Smoking Status Former   BSA 1.38 m²        Objective     Constitutional:       Appearance: Healthy appearance.   Eyes:      Pupils: Pupils are equal, round, and reactive to light.   Neck:      Vascular: JVD normal.   Pulmonary:      Breath sounds: Normal breath sounds.   Cardiovascular:      PMI at left midclavicular line. Normal rate. Regular rhythm. Normal S1. Normal S2.       Murmurs: There is no murmur.      No gallop.  No click. No rub.   Pulses:     Intact distal pulses.   Edema:     Peripheral edema absent.   Abdominal:      Palpations: Abdomen is soft.      Tenderness: There is no abdominal tenderness.   Musculoskeletal:      Extremities: No clubbing present.Skin:     General: Skin is warm and dry.   Neurological:      General: No focal deficit present.            Lab Review:         Lab Results   Component Value Date    CHOL 172 09/18/2023    CHOL 159 10/14/2022    CHOL 191 09/28/2021     Lab Results   Component Value Date    HDL 77 09/18/2023    HDL 57 10/14/2022    HDL 75 09/28/2021     Lab Results   Component Value Date    LDLCALC 86 09/18/2023    LDLCALC 85 10/14/2022    LDLCALC 102 09/28/2021     Lab Results   Component Value Date    TRIG 45 09/18/2023    TRIG 87  "10/14/2022    TRIG 70 09/28/2021     No components found for: \"CHOLHDL\"     Assessment/Plan     Atherosclerosis of native coronary artery of native heart without angina pectoris  The stress shows he ay have had an MI and he may have had symptoms of it months ago.  Will treat medically for now.  Will start on imdur and hold off on beta blockers due to his lungs.  Will see in 4 month     "

## 2024-08-27 NOTE — ASSESSMENT & PLAN NOTE
With cognitive decline likely Dementia and Hx of hallucination  Patient uses manual wheelchair  Continue safety and fall precaution  Completed PT in [place]  Continue Sinemet [25-100mg] 2 tablets TID  Continue Quetiapine 25mg daily/ Rivastigmine 9.5mg patch daily  Followed and managed by [facility] Neurology office as out-patient

## 2024-08-27 NOTE — ASSESSMENT & PLAN NOTE
Patient reported nasal congestion/ rhinorrhea since yesterday (8/26/2024)  Nasal voice today  Patient denies SOB/ VILLA on assessment  (+) scattered rhonchi with diminshed breath sounds to bases  No hypoxia on RA. Afebrile  Rapid COVID-19 test (8/27/2024): NEGATIVE  Start Duo-neb TID PRN  Start Guaifenesin 400mg TID x 5 days  V/S daily x 5 days  Nursing to continue to monitor for new or worsening of symptoms.

## 2024-08-27 NOTE — PROGRESS NOTES
[facility]  [address] [place] [address], [address], [place] [address]  [phone]    NAME: Cheikh Magana  AGE: 83 y.o. 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  Followed and managed by Hema/Oncology office as out-patient    Sezary's disease (HCC)  Followed and managed by Dermatology and Hema/Onco offices as out-patient.  Continue Triamcinolone 0.1% cream to rash on trunk and chest areas.  Currently receiving Poteligeo infusion every 2 weeks    Hypertension  BP goal: < 140/90  BP range (8/12-26/2024) = 92/51 to 154/70  ** 1 SBP > 144 on this period  HR range (8/12-26/2024)  = 58 to 77/min  Not on anti-HTN medication including diuretic therapy  On monthly V/S in half-way    Acquired hypothyroidism  Lab Results   Component Value Date    TSH 1.17 07/14/2024   Continue Levothyroxine 25mcg daily    Hyperlipidemia  Last Lipid test (2/11/2021): WNL except for:  - low HDL: 32.   LDL C: 44 (2/11/2021)  Continue Atorvastatin 80mg daily  Check Lipid Profile on 8/30/2024    Anemia  Chronic Anemia  Last Hbg/Hct (8/7/2024): 10.5/ 30.6 (L) <= 10.2/ 29.6 (L: 8/5/2024)  Not in iron replacement  Continue Vit C 500mg daily  Followed by [facility] officce as out-patient.  Next CBC on 8/30/2024    H/O: CVA (cerebrovascular accident)  Hx of Left MCA ischemic stroke  Continue ASA 81mg daily/ Atorvastatin 80mg daily    Oropharyngeal dysphagia  Hx of aspiration Pneumonia  Last CXR (8/7/2024) showed persistent Right LL opacity  Currently  "on Regular diet with Nectar thick liquids in [place]  Followed by ST in [facility]    Reactive depression  Continue Paroxetine 10mg daily  Consult Behavioral Health CRNP in [place] -if family agreeable    Acute nasopharyngitis  Patient reported nasal congestion/ rhinorrhea since yesterday (8/26/2024)  Nasal voice today  Patient denies SOB/ VILLA on assessment  (+) scattered rhonchi with diminshed breath sounds to bases  No hypoxia on RA. Afebrile  Rapid COVID-19 test (8/27/2024): NEGATIVE  Start Duo-neb TID PRN  Start Guaifenesin 400mg TID x 5 days  V/S daily x 5 days  Nursing to continue to monitor for new or worsening of symptoms.    Ambulatory dysfunction  Hx of fall in [facility] (8/18/2024)  Transfers with assist of one and RW    Patient uses manual wheelchair in [place]  Continue safety and fall precautions in [facility]    Pneumonia of right lower lobe due to infectious organism  Likely Aspiration Pneumonia  Completed 7 days of Ceftriaxone 1G daily (8/7-13/2024)  Repeat CXR 6 weeks from 8/7/2024: due approximately on week of 9/25/2024  - to check for resolution or clearance of RLL opacity  Has pending follow-up labs on 8/30/2024: CBC with diff and CMP      Chief complaint / Reason for visit: Transition of Care  Visit    History of Present Illness:  This is an 83-year-old male patient recently admitted to [facility] on 8/12/2024 after completing skilled nursing and rehabilitation services at  [facility].  Patient is seen and examined today for his transition of care visit and to follow-up for any acute and chronic medical conditions.     Patient is still in bed for this visit -alert, cooperative, calm, present and not in distress.  Patient is verbal with clear soft speech -oriented to name/birthday/current date and place.  Patient reported feeling \"alright\".  Patient sounded nasally congested -on assessment reported that it started yesterday.  Patient reported clear rhinorrhea and nasal congestion but denies " fever/chills, baseline coughing, denies feeling sick, change breathing patterns on assessment. Other than nasal congestion, patient denies any other acute medical concerns during ROS assessment including pain.  Noted dried emesis on bed sheet.  Patient reported that he vomited often triggered by coughing when he is in bed.  Nursing reported that since patient admitted to [facility], he has been observed to have dried emesis on his sheets every morning and multiple used tissues on the floor.  Nursing has not observed any nausea or vomiting during meals. Patient denies eating in bed. Nursing is also reported that as soon as patient is out of bed, he seems to be more alert, stable and no evidence of pulmonary congestion/lethargy.  Patient is also reported to be eating well -have to be reminded by staff multiple times to adhere to current dietary consistency.  Nursing has no acute medical concerns for this visit.    Called and spoken to patient's daughter.  Discussed reason for visit/findings/plan of care as indicated above.  The patient's daughter is agreeable to plan of care.  Daughter reported the patient needs his annual Medicare wellness visit -patient is overdue for 2 years since he has been having multiple hospitalizations.  Will likely schedule next month.  No further concerns or questions on this call.    Nursing and prior provider notes reviewed on this visit. Discussed visit with PCP and nursing staff/ supervisor.    Review of Systems:  Per history of present illness, all other systems reviewed and negative.    HISTORY:  Medical Hx: Reviewed, unchanged  Family Hx: Reviewed, unchanged  Soc Hx: Reviewed,  unchanged    ALLERGY: Reviewed, unchanged.   Allergies   Allergen Reactions    Itraconazole Other (See Comments)     Made rash worse per patient's daughterJackie   Other reaction(s): Rash    Prednisone Other (See Comments) and Rash     rash        PHYSICAL EXAM:  Vital Signs: T97.6F-HR 83 -R18 -BP: 105/55  "(8/26/2024) -SpO2: 91% RA (lying down)  Weight: 159.0 lbs (8/18/2024) <= 158.7 lbs (8/12/2024)    General: NAD. Well appearing. No acute distress. Frail stature.  Head: Atraumatic. Normocephalic.  Eye Exam: anicteric sclera, no discharge, PERRLA, No injection  Oral Exam: moist mucous membranes, no buccaloropharyngeal erythema, palatine tonsils WNL.  Neck Exam: no anterior cervical lymphadenopathy noted, neck supple. Trachea midline, no carotid bruit, no masses  Cardiovascular: regular rate, irregular rhythm, no: murmurs/ rubs. (+) gallop. S1 and S2 appreciated.  Pulmonary: no wheeze, (+) rhonchi, no rales. No chest tenderness. Normal chest wall expansion. Diminished breath sounds to bases.  Abdominal: soft, non-tender, nondistended, bowel sounds audible x 4 quadrants. No palpable hepatosplenomegaly, no tympany  : Non distended bladder. Incontinent.  Extremities and skin: no edema noted, no rashes. Intact skin  Neurological: alert, cooperative and responsive, Oriented x 3. No tics, normal sensation to pressure and light touch.  moving all 4 extremities symmetrically. Uses manual wheelchair.    Laboratory / Imaging results reviewed. Last labs done on 8/7/2024    Current Medications: All medications reviewed and updated in Nursing Home eMAR.    Please note: This note was completed in part utilizing a voice-recognition software may have been used in the preparation of this document. Grammatical errors, random word insertion, spelling mistakes, and incomplete sentences may be an occasional consequence of the system secondary to software limitations, ambient noise and hardware issues. Occasional wrong word or \"sound-alike\" substitutions may have occurred due to the inherent limitations of voice recognition software. At the time of dictation, efforts were made to edit, clarify and/or correct errors. Interpretation should be guided by context. Please read the chart carefully and recognize, using context, where " substitutions have occurred. If you have any questions or concerns about the context, text or information contained within the body of this dictation, please contact myself, the provider, for further clarification.      SOLE Esquivel  8/27/2024

## 2024-08-27 NOTE — ASSESSMENT & PLAN NOTE
Recently managed for Sepsis likely from Aspiration Pneumonia  CXR (8/7/2024): showed persistent Right LL opacity  Currently on Ceftriaxone x 7 days for fever/ SOB/ VILLA since 8/7/2024  Continue Ground meats with nectar thick liquids  Continue aspiration protocol: OOB and sitting upright during all meals

## 2024-08-27 NOTE — ASSESSMENT & PLAN NOTE
BP goal: < 140/90  BP range (8/12-26/2024) = 92/51 to 154/70  ** 1 SBP > 144 on this period  HR range (8/12-26/2024)  = 58 to 77/min  Not on anti-HTN medication including diuretic therapy  On monthly V/S in [facility]

## 2024-08-27 NOTE — ASSESSMENT & PLAN NOTE
"Hx of recurrent respiratory infection including aspiration pneumonia  CXR (8/7/2024): \" Persistent Right lower lobe opacity\".  WBC (8/7/2024): 9.6 (WNL) / Ab Neutrop: 8.8 (H) / Ab Lymph: 0.1 (L)  Renal function (8/7/2024): Crea: 0.96/ BUN: 23/ eGFR: 79  LCTA. Diminished on the bases.  Noted intermitted coughing on this visit  VSS. No hypoxia on RA  Continue Ceftriaxone 1G daily to complete 7 days  Continue Duoneb TID PRN  Nursing to continue to monitor for new or worsening symptoms.  "

## 2024-08-27 NOTE — PROGRESS NOTES
"[facility]  [address] [place] [address], [address], [place] [address]  [phone]    NAME: Cheikh Magana  AGE: 83 y.o. SEX: male    Progress Note    Location: [facility]  POS: 31 ([facility])    Assessment/Plan:    Pneumonia of right lower lobe due to infectious organism  Hx of recurrent respiratory infection including aspiration pneumonia  CXR (8/7/2024): \" Persistent Right lower lobe opacity\".  WBC (8/7/2024): 9.6 (WNL) / Ab Neutrop: 8.8 (H) / Ab Lymph: 0.1 (L)  Renal function (8/7/2024): Crea: 0.96/ BUN: 23/ eGFR: 79  LCTA. Diminished on the bases.  Noted intermitted coughing on this visit  VSS. No hypoxia on RA  Continue Ceftriaxone 1G daily to complete 7 days  Continue Duoneb TID PRN  Nursing to continue to monitor for new or worsening symptoms.    Thrombocytopenia (HCC)  Platelet (8/7/2024): 241 (WNL)  On ASA 81mg daily    Oropharyngeal dysphagia  Recently managed for Sepsis likely from Aspiration Pneumonia  CXR (8/7/2024): showed persistent Right LL opacity  Currently on Ceftriaxone x 7 days for fever/ SOB/ VILLA since 8/7/2024  Continue Ground meats with nectar thick liquids  Continue aspiration protocol: OOB and sitting upright during all meals      Chief complaint / Reason for visit: Acute Visit    History of Present Illness:  This is an 83-year-old male patient currently admitted at [facility] (7/19/2024 tp present) for skilled nursing and rehabilitation services.  Patient scheduled for discharge to [facility] on 8/8/2024 but now on hold for change in medical condition noted on 8/7/2024: increase in SOB/ VILLA, T103.2F and hypoxia. Evaluated and seen by Dr Ho and managed for Pneumonia  of RLL per CXR result done on 8/7/2024 which showed Persistent opacity at the right lung base.  Patient seen and examined today for follow-up acute medical conditions as mentioned above.    Patient is out of bed for this visit sitting in manual wheelchair in room -alert, cooperative, calm, very pleasant and not in distress.  " "Patient is verbally engaged with clear coherent speech -at baseline mentation/orientation.  Patient acknowledged feeling well today, \" I'm fine\" -denies any acute medical concerns during ROS assessment including pain.  Per nursing, patient appears to be back to baseline. VSS. NO hypoxia: 96-99% RA.  Patient denies having any coughing. LTCA.  Nursing has no acute medical concerns for this visit.    Nursing and prior provider notes reviewed on this visit. Discussed visit with PCP and nursing staff/ supervisor.    Review of Systems:  Per history of present illness, all other systems reviewed and negative.    HISTORY:  Medical Hx: Reviewed, unchanged  Family Hx: Reviewed, unchanged  Soc Hx: Reviewed,  unchanged    ALLERGY: Reviewed, unchanged.   Allergies   Allergen Reactions    Itraconazole Other (See Comments)     Made rash worse per patient's daughterJackie   Other reaction(s): Rash    Prednisone Other (See Comments) and Rash     rash        PHYSICAL EXAM:  Vital Signs: T98.1F -P59 -R16 BP: 114/69 SpO2: 96-99% RA  Weight: 159.1 lbs (8/4/2024)    General: NAD. Well appearing. No acute distress  Head: Atraumatic. Normocephalic.  Eye Exam: anicteric sclera, no discharge, PERRLA, No injection  Oral Exam: moist mucous membranes, no buccaloropharyngeal erythema, palatine tonsils WNL.  Neck Exam: no anterior cervical lymphadenopathy noted, neck supple. Trachea midline, no carotid bruit, no masses  Cardiovascular: regular rate, irregular rhythm, no: murmurs/ rubs. (+) gallop. S1 and S2 appreciated.  Pulmonary: no wheeze, no rhonchi, no rales. No chest tenderness. Normal chest wall expansion  Abdominal: soft, non-tender, nondistended, bowel sounds audible x 4 quadrants. No palpable hepatosplenomegaly, no tympany  : Non distended bladder.   Extremities and skin: no edema noted, no rashes. Intact skin  Neurological: alert, cooperative and responsive, Oriented x 2. No tics, normal sensation to pressure and light touch.  " "moving all 4 extremities symmetrically. Uses manual wheelchair.    Laboratory / Imaging results reviewed. Last labs done on 8/7/2024    Current Medications: All medications reviewed and updated in Nursing Home eMAR.    Please note: This note was completed in part utilizing a voice-recognition software may have been used in the preparation of this document. Grammatical errors, random word insertion, spelling mistakes, and incomplete sentences may be an occasional consequence of the system secondary to software limitations, ambient noise and hardware issues. Occasional wrong word or \"sound-alike\" substitutions may have occurred due to the inherent limitations of voice recognition software. At the time of dictation, efforts were made to edit, clarify and/or correct errors. Interpretation should be guided by context. Please read the chart carefully and recognize, using context, where substitutions have occurred. If you have any questions or concerns about the context, text or information contained within the body of this dictation, please contact myself, the provider, for further clarification.      SOLE Esquivel  8/27/2024  "

## 2024-08-27 NOTE — ASSESSMENT & PLAN NOTE
Chronic Anemia  Last Hbg/Hct (8/7/2024): 10.5/ 30.6 (L) <= 10.2/ 29.6 (L: 8/5/2024)  Not in iron replacement  Continue Vit C 500mg daily  Followed by [facility]/Onco officce as out-patient.  Next CBC on 8/30/2024

## 2024-08-27 NOTE — ASSESSMENT & PLAN NOTE
Hx of aspiration Pneumonia  Last CXR (8/7/2024) showed persistent Right LL opacity  Currently on Regular diet with Nectar thick liquids in [place]  Followed by ST in [place]

## 2024-08-27 NOTE — ASSESSMENT & PLAN NOTE
Recent pathology result faxed to Hema/Onco office today.  Followed and managed by Hema/Oncology office as out-patient

## 2024-09-05 ENCOUNTER — IN HOME VISIT (OUTPATIENT)
Dept: GERIATRICS | Facility: OTHER | Age: 84
End: 2024-09-05
Payer: MEDICARE

## 2024-09-05 DIAGNOSIS — R50.9 FEVER, UNSPECIFIED FEVER CAUSE: ICD-10-CM

## 2024-09-05 DIAGNOSIS — R13.12 OROPHARYNGEAL DYSPHAGIA: ICD-10-CM

## 2024-09-05 DIAGNOSIS — J18.9 PNEUMONIA OF RIGHT LOWER LOBE DUE TO INFECTIOUS ORGANISM: Primary | ICD-10-CM

## 2024-09-05 DIAGNOSIS — C84.10 SEZARY'S DISEASE, UNSPECIFIED BODY REGION (HCC): Chronic | ICD-10-CM

## 2024-09-05 PROCEDURE — 99348 HOME/RES VST EST LOW MDM 30: CPT | Performed by: NURSE PRACTITIONER

## 2024-09-05 NOTE — ASSESSMENT & PLAN NOTE
Sudden onset (9/4/2024): T101.0F  CXR (9/4/2024): improved result comparative to 8/7/2024  With chronic rhinorrhea  LCTA. No hypoxia  Appetite and mentation remain at baseline  RAPID COVID-19 test (9/4/2024): NEGATIVE  Check UA with C&S today

## 2024-09-05 NOTE — ASSESSMENT & PLAN NOTE
Receiving Poteligeo infusion (Hema/Onco)  - medication has common S/E of fever listed in epocrates  Fever since 9/4/2024 - unclear source at this time  WBC (9/4/2024): 3.8 (L)  Ab Lymph (9/4/2024): 0.2 (L)  Ab Neutroph: 2.9 (WNL)  V/S BID x 5 days

## 2024-09-05 NOTE — PROGRESS NOTES
"[facility]  [address] [place] [address] Rd, [address], [place] [address]  [phone]    NAME: Cheikh Magana  AGE: 83 y.o. SEX: male    Progress Note    Location: [facility] ([place])  POS: 13    Assessment/Plan:    Pneumonia of right lower lobe due to infectious organism  Sudden onset fever: T101.0F on 9/4/2024 improved with Tylenol but persist at low grade T100.1F today 9/5/2024  CXR (9/4/2024): \" Persistent very mild Right Lower lung airspace disease, favoring scarring and atelectasis, though concerning for residual Pneumonia in the clinical setting of infection\". (Compared to CXR 8/7/2024 & 7/26/2024).  (+) Nasal congestion/ rhinorrhea - chronic  Non coughing.  LCTA. No hypoxia on RA: 93-97% today HR: 67-72/min  No respiratory distress noted.  CBC with diff (9/5/2024): WNL except:  - WBC: 3.8 (L)  - Hbg/Hct: 10.0/ 29.7 (L)  - RBC: 3.66 (L)  - MPV: 7.1 (L)  - RDW: 16.5 (H)  - Ab Lymph: 0.2 (L)  Rapid COVID-19 (9/4/2024): Negative  Start Cefpodoxime 200mg BID x 5 days  V/S BID x 5 days  Nursing to continue to monitor for new or worsening symptoms    Fever  Sudden onset (9/4/2024): T101.0F  CXR (9/4/2024): improved result comparative to 8/7/2024  With chronic rhinorrhea  LCTA. No hypoxia  Appetite and mentation remain at baseline  RAPID COVID-19 test (9/4/2024): NEGATIVE  Check UA with C&S today    Sezary's disease (HCC)  Receiving Poteligeo infusion (Hema/Onco)  - medication has common S/E of fever listed in epocrates  Fever since 9/4/2024 - unclear source at this time  WBC (9/4/2024): 3.8 (L)  Ab Lymph (9/4/2024): 0.2 (L)  Ab Neutroph: 2.9 (WNL)  V/S BID x 5 days      Oropharyngeal dysphagia  Hx of Pneumonia/ Aspiration Pneumonitis  Current diet in [facility]: Regular with nectar thick liquids  Has pending follow up Video swallow and speech evaluation       Chief complaint / Reason for visit: Acute Visit    History of Present Illness:  This is an 83-year-old male patient residing at [facility].  Patient " is seen and examined today per nursing request to follow-up sudden onset fever of T101.0F on 9/4/2024 and no other symptoms. Ordered labs (9/5/2024) and CXR on 9/4/2024 - results reviewed and benign.  No clear etiology -possible unresolved pneumonia. Ordered UA with C&S today to check for urinary infection.  Started on prophylactic empiric antibiotic: Cefpodoxime.    Nursing and prior provider notes reviewed on this visit. Discussed visit with PCP and nursing staff/ supervisor.    Review of Systems:  Per history of present illness, all other systems reviewed and negative.    HISTORY:  Medical Hx: Reviewed, unchanged  Family Hx: Reviewed, unchanged  Soc Hx: Reviewed,  unchanged    ALLERGY: Reviewed, unchanged.   Allergies   Allergen Reactions    Itraconazole Other (See Comments)     Made rash worse per patient's daughterJackie   Other reaction(s): Rash    Prednisone Other (See Comments) and Rash     rash        PHYSICAL EXAM:  Vital Signs: T 100.1F -HR 64 -R 16 -BP: 144/68 -SpO2 98% RA  Weight: 159.0 lbs (8/18/2024)    General: Well appearing. No acute distress. Frail stature.  Head: Atraumatic. Normocephalic.  Eye Exam: anicteric sclera, no discharge, PERRLA, No injection  Oral Exam: moist mucous membranes, no buccaloropharyngeal erythema, palatine tonsils WNL.  Nose: Rhinorrhea  Neck Exam: no anterior cervical lymphadenopathy noted, neck supple. Trachea midline, no carotid bruit, no masses  Cardiovascular: regular rate, irregular rhythm, no: murmurs/ rubs. (+) gallop. S1 and S2 appreciated.  Pulmonary: no wheeze, no rhonchi, no rales. No chest tenderness. Normal chest wall expansion. Diminished breath sounds to bases.  Abdominal: soft, non-tender, nondistended, bowel sounds audible x 4 quadrants. No palpable hepatosplenomegaly, no tympany  : Non distended bladder. Continent.  Extremities and skin: no edema noted, no rashes. Intact skin  Neurological: alert, cooperative and responsive, Oriented x 3. No tics,  "normal sensation to pressure and light touch.  moving all 4 extremities symmetrically. Uses manual wheelchair.    Laboratory / Imaging results reviewed. Last labs done on 9/5/2024    Current Medications: All medications reviewed and updated in Nursing Home eMAR.    Please note: This note was completed in part utilizing a voice-recognition software may have been used in the preparation of this document. Grammatical errors, random word insertion, spelling mistakes, and incomplete sentences may be an occasional consequence of the system secondary to software limitations, ambient noise and hardware issues. Occasional wrong word or \"sound-alike\" substitutions may have occurred due to the inherent limitations of voice recognition software. At the time of dictation, efforts were made to edit, clarify and/or correct errors. Interpretation should be guided by context. Please read the chart carefully and recognize, using context, where substitutions have occurred. If you have any questions or concerns about the context, text or information contained within the body of this dictation, please contact myself, the provider, for further clarification.      SOLE Esquivel  9/5/2024  "

## 2024-09-05 NOTE — ASSESSMENT & PLAN NOTE
Hx of Pneumonia/ Aspiration Pneumonitis  Current diet in [facility]: Regular with nectar thick liquids  Has pending follow up Video swallow and speech evaluation

## 2024-09-05 NOTE — ASSESSMENT & PLAN NOTE
"Sudden onset fever: T101.0F on 9/4/2024 improved with Tylenol but persist at low grade T100.1F today 9/5/2024  CXR (9/4/2024): \" Persistent very mild Right Lower lung airspace disease, favoring scarring and atelectasis, though concerning for residual Pneumonia in the clinical setting of infection\". (Compared to CXR 8/7/2024 & 7/26/2024).  (+) Nasal congestion/ rhinorrhea - chronic  Non coughing.  LCTA. No hypoxia on RA: 93-97% today HR: 67-72/min  No respiratory distress noted.  CBC with diff (9/5/2024): WNL except:  - WBC: 3.8 (L)  - Hbg/Hct: 10.0/ 29.7 (L)  - RBC: 3.66 (L)  - MPV: 7.1 (L)  - RDW: 16.5 (H)  - Ab Lymph: 0.2 (L)  Rapid COVID-19 (9/4/2024): Negative  Start Cefpodoxime 200mg BID x 5 days  V/S BID x 5 days  Nursing to continue to monitor for new or worsening symptoms  "

## 2024-09-06 PROBLEM — J18.9 PNEUMONIA OF RIGHT LOWER LOBE DUE TO INFECTIOUS ORGANISM: Status: RESOLVED | Noted: 2024-08-07 | Resolved: 2024-09-06

## 2024-10-05 PROBLEM — R50.9 FEVER: Status: RESOLVED | Noted: 2024-09-05 | Resolved: 2024-10-05

## 2024-10-15 ENCOUNTER — IN HOME VISIT (OUTPATIENT)
Dept: GERIATRICS | Facility: OTHER | Age: 84
End: 2024-10-15
Payer: MEDICARE

## 2024-10-15 DIAGNOSIS — R13.12 OROPHARYNGEAL DYSPHAGIA: Primary | ICD-10-CM

## 2024-10-15 PROCEDURE — 99348 HOME/RES VST EST LOW MDM 30: CPT | Performed by: NURSE PRACTITIONER

## 2024-10-15 RX ORDER — POLYVINYL ALCOHOL 14 MG/ML
1 SOLUTION/ DROPS OPHTHALMIC 3 TIMES DAILY
COMMUNITY

## 2024-10-15 RX ORDER — ONDANSETRON 4 MG/1
4 TABLET, FILM COATED ORAL EVERY 6 HOURS PRN
COMMUNITY

## 2024-10-15 NOTE — PROGRESS NOTES
[address], [address], [place] [address]  [phone]    NAME: Cheikh Magana  AGE: 84 y.o. SEX: male    Progress Note    Location: [facility] ([place])  POS: 13    Assessment/Plan:    Oropharyngeal dysphagia  Hx of aspiration pneumonitis/ Pneumonia  Chronic coughing/ nausea/ vomiting when in bed with production of copious mucus  Chronic coarse rhonchi/ wheezing and coughing when in bed  Follow-up CXR (8/7/2024) showed persistent Right lung base opacity likely representing infiltrate/scarring or atelectasis  Patient must eat OOB and sitting upright  Patient will benefit from keeping head of bed at least 30 degrees to reduce aspiration of oral mucous/ saliva  Patient currently on regular bed and mattress that does not have the capacity to adjust HOB when in bed  Order: Hospital bed with appropriate mattress to use in [facility] to improve comfort/ safety.  Cleared for regular textured food with thin liquids by Speech  Ambulatory referral to GI office done.       Chief complaint / Reason for visit: Acute Visit    History of Present Illness:  This is an 84-year-old male patient residing at [facility].  Patient is seen and examined today per nursing request patient's persistent chest congestion/coughing/nausea/vomiting when patient is in bed -particularly worse in the morning.  Per nursing, patient often presents as heavily congested, dried vomitus on sheet every morning.  Patient is also reported to have  all night coughing both by roommate and nursing staff.  Patient sleeps flat in a regular bed and mattress in [facility].  With patient's history of aspiration pneumonia and persistent unresolved Right lower lobe infiltrate -would recommend use of hospital bed in order to keep HOB at least 30 degrees -reduce risk for aspiration into airway, provide comfort at bedtime and to promote better sleep at .     Nursing and prior provider notes reviewed on this visit. Discussed visit with PCP  and nursing staff/ supervisor.    Review of Systems:  Per history of present illness, all other systems reviewed and negative.    HISTORY:  Medical Hx: Reviewed, unchanged  Family Hx: Reviewed, unchanged  Soc Hx: Reviewed,  unchanged    ALLERGY: Reviewed, unchanged.   Allergies   Allergen Reactions    Itraconazole Other (See Comments)     Made rash worse per patient's daughterJackie   Other reaction(s): Rash    Prednisone Other (See Comments) and Rash     rash        PHYSICAL EXAM:  Vital Signs: T 98.0F -HR 61 -R 19 -BP: 130/69 (10/13/2024) -SpO2: 89% (when in bed and improved to) => 98% RA when OOB  Weight: 146.7 lbs (10/9/2024) <= 1158.6 lbs (9/9/2024) <= 159.0 lbs (8/18/2024)    General: Well appearing. No acute distress. Frail stature.  Head: Atraumatic. Normocephalic.  Eye Exam: anicteric sclera, no discharge, PERRLA, No injection  Oral Exam: moist mucous membranes, no buccaloropharyngeal erythema, palatine tonsils WNL.  Nose: Rhinorrhea nad chronic congestion  Neck Exam: no anterior cervical lymphadenopathy noted, neck supple. Trachea midline, no carotid bruit, no masses  Cardiovascular: regular rate, irregular rhythm, no: murmurs/ rubs. (+) gallop. S1 and S2 appreciated.  Pulmonary: no wheeze, no rhonchi when OOB, no rales. No chest tenderness. Normal chest wall expansion. Diminished breath sounds to bases.  Abdominal: soft, non-tender, nondistended, bowel sounds audible x 4 quadrants. No palpable hepatosplenomegaly, no tympany  : Non distended bladder. Continent.  Extremities and skin: no edema noted, no rashes. Intact skin  Neurological: alert, cooperative and responsive, Oriented x 3. No tics, normal sensation to pressure and light touch.  moving all 4 extremities symmetrically. Uses manual wheelchair.    Laboratory / Imaging results reviewed. Last labs done on 9/30/2024    Current Medications: All medications reviewed and updated in Nursing Home eMAR.    Please note: This note was completed in part  "utilizing a voice-recognition software may have been used in the preparation of this document. Grammatical errors, random word insertion, spelling mistakes, and incomplete sentences may be an occasional consequence of the system secondary to software limitations, ambient noise and hardware issues. Occasional wrong word or \"sound-alike\" substitutions may have occurred due to the inherent limitations of voice recognition software. At the time of dictation, efforts were made to edit, clarify and/or correct errors. Interpretation should be guided by context. Please read the chart carefully and recognize, using context, where substitutions have occurred. If you have any questions or concerns about the context, text or information contained within the body of this dictation, please contact myself, the provider, for further clarification.      SOLE Esquivel  10/15/2024  "

## 2024-10-15 NOTE — ASSESSMENT & PLAN NOTE
Hx of aspiration pneumonitis/ Pneumonia  Chronic coughing/ nausea/ vomiting when in bed with production of copious mucus  Chronic coarse rhonchi/ wheezing and coughing when in bed  Follow-up CXR (8/7/2024) showed persistent Right lung base opacity likely representing infiltrate/scarring or atelectasis  Patient must eat OOB and sitting upright  Patient will benefit from keeping head of bed at least 30 degrees to reduce aspiration of oral mucous/ saliva  Patient currently on regular bed and mattress that does not have the capacity to adjust HOB when in bed  Order: Hospital bed with appropriate mattress to use in [facility] to improve comfort/ safety.  Cleared for regular textured food with thin liquids by Speech  Ambulatory referral to GI office done.

## 2024-10-22 ENCOUNTER — IN HOME VISIT (OUTPATIENT)
Dept: GERIATRICS | Facility: OTHER | Age: 84
End: 2024-10-22
Payer: MEDICARE

## 2024-10-22 DIAGNOSIS — G20.A2 PARKINSON'S DISEASE WITH FLUCTUATING MANIFESTATIONS, UNSPECIFIED WHETHER DYSKINESIA PRESENT (HCC): Chronic | ICD-10-CM

## 2024-10-22 DIAGNOSIS — R53.83 LETHARGY: Primary | ICD-10-CM

## 2024-10-22 DIAGNOSIS — R13.12 OROPHARYNGEAL DYSPHAGIA: ICD-10-CM

## 2024-10-22 DIAGNOSIS — R62.7 FAILURE TO THRIVE IN ADULT: ICD-10-CM

## 2024-10-22 PROCEDURE — 99349 HOME/RES VST EST MOD MDM 40: CPT | Performed by: NURSE PRACTITIONER

## 2024-10-22 NOTE — ASSESSMENT & PLAN NOTE
Multifactorial  Overall declined  Slow progressive weight loss but greatest loss in the last 30 days: 11.9 lbs in 1 month   Generalized weakness and anorexia on the last few days - worsened over the weekend  With severe esophageal dysmotility (Fluoroscopy: 10/14/2024) and hx of oropharyngeal dysphagia  Send to ER for evaluation and management

## 2024-10-22 NOTE — ASSESSMENT & PLAN NOTE
"Patient presented with generalized weakness/anorexia and lethargy  Patient acknowledged loss of interest with food/ appetite. Last meal last night: then vomited later as reported by nursing.  Significant weight loss: 11.9 lbs in 1 month  Hx of aspiration Pneumonia/ pneumonitis  Hx of oropharyngeal dysphagia  With Esophageal dysmotility and \"cork screw esophagus\"  Send to ER for further evaluation and management   "

## 2024-10-22 NOTE — ASSESSMENT & PLAN NOTE
Currently on Sinemet [24-100mg) 2 tablets TID  Followed by [facility] Neurology office

## 2024-10-22 NOTE — ASSESSMENT & PLAN NOTE
Evaluated by Speech therapy and cleared to have regular textured food and thin liquids since last month  With chronic and worsening frequency of nausea and vomiting during and after meals now   Poor meal completion: 0-25% on the last few days.  Lethargic with generalized weakness today  Send to ER for evaluation

## 2024-10-30 ENCOUNTER — TELEPHONE (OUTPATIENT)
Age: 84
End: 2024-10-30

## 2024-10-30 NOTE — TELEPHONE ENCOUNTER
Received via fax - Death Certificate to be completed.    From:  [facility] - [phone].     Contacted provider  As per provider:   Nursing has contacted hospice and was told that they will follow up.